# Patient Record
Sex: FEMALE | URBAN - METROPOLITAN AREA
[De-identification: names, ages, dates, MRNs, and addresses within clinical notes are randomized per-mention and may not be internally consistent; named-entity substitution may affect disease eponyms.]

---

## 2018-01-18 ENCOUNTER — OFFICE VISIT (OUTPATIENT)
Dept: INTERNAL MEDICINE CLINIC | Age: 38
End: 2018-01-18

## 2018-01-18 VITALS
SYSTOLIC BLOOD PRESSURE: 141 MMHG | WEIGHT: 189.2 LBS | DIASTOLIC BLOOD PRESSURE: 67 MMHG | HEART RATE: 98 BPM | OXYGEN SATURATION: 95 % | BODY MASS INDEX: 29.7 KG/M2 | HEIGHT: 67 IN | RESPIRATION RATE: 18 BRPM | TEMPERATURE: 97.9 F

## 2018-01-18 DIAGNOSIS — S02.5XXA CLOSED FRACTURE OF TOOTH, INITIAL ENCOUNTER: ICD-10-CM

## 2018-01-18 DIAGNOSIS — R63.1 EXCESSIVE THIRST: ICD-10-CM

## 2018-01-18 DIAGNOSIS — R73.09 ELEVATED GLUCOSE: ICD-10-CM

## 2018-01-18 DIAGNOSIS — R80.9 PROTEINURIA, UNSPECIFIED TYPE: ICD-10-CM

## 2018-01-18 DIAGNOSIS — R03.0 ELEVATED BLOOD PRESSURE READING: ICD-10-CM

## 2018-01-18 DIAGNOSIS — E66.3 OVERWEIGHT (BMI 25.0-29.9): ICD-10-CM

## 2018-01-18 DIAGNOSIS — Z13.220 SCREENING FOR LIPID DISORDERS: ICD-10-CM

## 2018-01-18 DIAGNOSIS — G44.209 TENSION-TYPE HEADACHE, NOT INTRACTABLE, UNSPECIFIED CHRONICITY PATTERN: ICD-10-CM

## 2018-01-18 DIAGNOSIS — R35.0 URINARY FREQUENCY: Primary | ICD-10-CM

## 2018-01-18 DIAGNOSIS — R31.0 GROSS HEMATURIA: ICD-10-CM

## 2018-01-18 LAB
BILIRUB UR QL STRIP: NORMAL
GLUCOSE UR-MCNC: NEGATIVE MG/DL
KETONES P FAST UR STRIP-MCNC: NEGATIVE MG/DL
PH UR STRIP: 6 [PH] (ref 4.6–8)
PROT UR QL STRIP: NORMAL
SP GR UR STRIP: 1.03 (ref 1–1.03)
UA UROBILINOGEN AMB POC: NORMAL (ref 0.2–1)
URINALYSIS CLARITY POC: NORMAL
URINALYSIS COLOR POC: NORMAL
URINE BLOOD POC: NORMAL
URINE LEUKOCYTES POC: NORMAL
URINE NITRITES POC: NEGATIVE

## 2018-01-18 NOTE — MR AVS SNAPSHOT
[address] [address] [place] [address] 
[phone] Patient: Viral Denis MRN: [id number] LEQ:7/19/[birth year]14 Visit Information Date & Time Provider Department Dept. Phone Encounter #  
 1/18/2018  9:00 AM Cam[facility] Cord[facility] [phone] [id number] Follow-up Instructions Return in about 1 month (around 2/18/2018) for follow-up blood pressure and labs. Upcoming Health Maintenance Date Due DTaP/Tdap/Td series (1 - Tdap) 9/15/2003 PAP AKA CERVICAL CYTOLOGY 9/15/2003 Influenza Age 5 to Adult 8/1/2017 Allergies as of 1/18/2018  Review Complete On: 1/18/2018 By: Jimi Allen LPN No Known Allergies Current Immunizations  Reviewed on 1/18/2018 Name Date Influenza Vaccine 11/5/2017 Reviewed by Emelia Paez MD on 1/18/2018 at  9:48 AM  
You Were Diagnosed With   
  
 Codes Comments Urinary frequency    -  Primary ICD-10-CM: R35.0 ICD-9-CM: 788.41 Tension-type headache, not intractable, unspecified chronicity pattern     ICD-10-CM: G44.209 ICD-9-CM: 339.10 Excessive thirst     ICD-10-CM: R63.1 ICD-9-CM: 783.5 Elevated glucose     ICD-10-CM: R73.09 
ICD-9-CM: 790.29 Closed fracture of tooth, initial encounter     ICD-10-CM: S02. Jose Maria Wilson ICD-9-CM: 873.63 Screening for lipid disorders     ICD-10-CM: Z13.220 ICD-9-CM: V77.91 Overweight (BMI 25.0-29. 9)     ICD-10-CM: E07.4 ICD-9-CM: 278.02 Elevated blood pressure reading     ICD-10-CM: R03.0 ICD-9-CM: 796.2 Vitals BP Pulse Temp Resp Height(growth percentile) Weight(growth percentile) 141/67 (BP 1 Location: Left arm, BP Patient Position: Sitting) 98 97.9 °F (36.6 °C) (Oral) 18 5' 7\" (1.702 m) 189 lb 3.2 oz (85.8 kg) LMP SpO2 BMI OB Status Smoking Status 12/23/2017 (Exact Date) 95% 29.63 kg/m2 Having regular periods Never Smoker BMI and BSA Data Body Mass Index Body Surface Area  
 29.63 kg/m 2 2.01 m 2 Preferred Pharmacy Pharmacy Name Phone [id number], [address] [phone] Your Updated Medication List  
  
   
This list is accurate as of: 1/18/18 10:02 AM.  Always use your most recent med list.  
  
  
  
  
 B COMPLEX PO Take  by mouth. WOMEN'S DAILY MULTIVITAMIN PO Take  by mouth. We Performed the Following AMB POC URINALYSIS DIP STICK AUTO W/O MICRO [17235 CPT(R)] CBC W/O DIFF [96469 CPT(R)] HEMOGLOBIN A1C WITH EAG [95381 CPT(R)] LIPID PANEL [80465 CPT(R)] METABOLIC PANEL, COMPREHENSIVE [48858 CPT(R)] REFERRAL TO DENTISTRY [REF18 Custom] Comments:  
 Please consider getting tooth evaluated soon to avoid future infection. [facility] dentistry is one good source for quality dental care. [address] 
 
 
 
[address] [facility]: [phone] Follow-up Instructions Return in about 1 month (around 2/18/2018) for follow-up blood pressure and labs. Referral Information Referral ID Referred By Referred To  
  
 [id number] Anirudh Crowder Not Available Visits Status Start Date End Date 1 New Request 1/18/18 1/18/19 If your referral has a status of pending review or denied, additional information will be sent to support the outcome of this decision. Patient Instructions Learning About Low-Carbohydrate Diets for Weight Loss What is a low-carbohydrate diet? Low-carb diets avoid foods that are high in carbohydrate. These high-carb foods include pasta, bread, rice, cereal, fruits, and starchy vegetables. Instead, these diets usually have you eat foods that are high in fat and protein. Many people lose weight quickly on a low-carb diet.  But the early weight loss is water. People on this diet often gain the weight back after they start eating carbs again. Not all diet plans are safe or work well. A lot of the evidence shows that low-carb diets aren't healthy. That's because these diets often don't include healthy foods like fruits and vegetables. Losing weight safely means balancing protein, fat, and carbs with every meal and snack. And low-carb diets don't always provide the vitamins, minerals, and fiber you need. If you have a serious medical condition, talk to your doctor before you try any diet. These conditions include kidney disease, heart disease, type 2 diabetes, high cholesterol, and high blood pressure. If you are pregnant, it may not be safe for your baby if you are on a low-carb diet. How can you lose weight safely? You might have heard that a diet plan helped another person lose weight. But that doesn't mean that it will work for you. It is very hard to stay on a diet that includes lots of big changes in your eating habits. If you want to get to a healthy weight and stay there, making healthy lifestyle changes will often work better than dieting. These steps can help. · Make a plan for change. Work with your doctor to create a plan that is right for you. · See a dietitian. He or she can show you how to make healthy changes in your eating habits. · Manage stress. If you have a lot of stress in your life, it can be hard to focus on making healthy changes to your daily habits. · Track your food and activity. You are likely to do better at losing weight if you keep track of what you eat and what you do. Follow-up care is a key part of your treatment and safety. Be sure to make and go to all appointments, and call your doctor if you are having problems. It's also a good idea to know your test results and keep a list of the medicines you take. Where can you learn more? Go to http://serene-ran.info/. Enter A121 in the search box to learn more about \"Learning About Low-Carbohydrate Diets for Weight Loss. \" Current as of: May 12, 2017 Content Version: 11.4 © 5392-7127 Healthwise, Incorporated. Care instructions adapted under license by [employer] (which disclaims liability or warranty for this information). If you have questions about a medical condition or this instruction, always ask your healthcare professional. [address] any warranty or liability for your use of this information. Introducing [facility] & HEALTH SERVICES! Najma Isidro introduces Uniquedu patient portal. Now you can access parts of your medical record, email your doctor's office, and request medication refills online. 1. In your internet browser, go to [email or web address]/121 Rentals 2. Click on the First Time User? Click Here link in the Sign In box. You will see the New Member Sign Up page. 3. Enter your Uniquedu Access Code exactly as it appears below. You will not need to use this code after youve completed the sign-up process. If you do not sign up before the expiration date, you must request a new code. · Uniquedu Access Code: [email or web address] Expires: 4/18/2018 10:02 AM 
 
4. Enter the last four digits of your Social Security Number (xxxx) and Date of Birth (mm/dd/yyyy) as indicated and click Submit. You will be taken to the next sign-up page. 5. Create a Uniquedu ID. This will be your Uniquedu login ID and cannot be changed, so think of one that is secure and easy to remember. 6. Create a Uniquedu password. You can change your password at any time. 7. Enter your Password Reset Question and Answer. This can be used at a later time if you forget your password. 8. Enter your e-mail address. You will receive e-mail notification when new information is available in [address] 9. Click Sign Up. You can now view and download portions of your medical record. 10. Click the Download Summary menu link to download a portable copy of your medical information. If you have questions, please visit the Frequently Asked Questions section of the geolad website. Remember, geolad is NOT to be used for urgent needs. For medical emergencies, dial 911. Now available from your iPhone and Android! Please provide this summary of care documentation to your next provider. Your primary care clinician is listed as Tonia Ba. If you have any questions after today's visit, please call [phone].

## 2018-01-18 NOTE — PROGRESS NOTES
LEENA Vuong is a 30 y.o. female, she presents today for:     Has 3 children, 13 6and 3years old. Notes she has been having a lot of headaches, stomach pain. \"At first I thought I was pregnant, but I am not pregnant\". This has been true for around a week. Extreme thirst and urinating frequently. Normal BM, no constipation. No feeling hot cold nor having chills. No pain with urination no burning. Had pap smear done in December in last year. Moved from [place] around a year ago. Has regular periods, had an IUD removed. Would like to consider more children in 1-2 years.  currently. Took like 5 home pregnancy test. Due for next pregnancy test. No breast soreness. Period due in next week. Father with history of diabetes. PMH/PSH: reviewed and updated  Sochx:  reports that she has never smoked. She has never used smokeless tobacco. She reports that she does not drink alcohol or use illicit drugs. Famhx: reviewed and updated     All: No Known Allergies  Med:   Current Outpatient Prescriptions   Medication Sig    MULTIVIT WITH CALCIUM,IRON,MIN (WOMEN'S DAILY MULTIVITAMIN PO) Take  by mouth.  VITAMIN B COMPLEX (B COMPLEX PO) Take  by mouth. No current facility-administered medications for this visit. Review of Systems   Constitutional: Positive for malaise/fatigue. Negative for chills and fever. HENT: Negative for ear pain and sore throat. Eyes: Negative for blurred vision. Respiratory: Negative for cough and shortness of breath. Cardiovascular: Negative for chest pain and leg swelling. Gastrointestinal: Negative for blood in stool, constipation and heartburn. Genitourinary: Negative for dysuria. PE:  Blood pressure 141/67, pulse 98, temperature 97.9 °F (36.6 °C), temperature source Oral, resp. rate 18, height 5' 7\" (1.702 m), weight 189 lb 3.2 oz (85.8 kg), last menstrual period 12/23/2017, SpO2 95 %. Body mass index is 29.63 kg/(m^2).   Physical Exam Constitutional: She is oriented to person, place, and time. She appears well-developed and well-nourished. No distress. HENT:   Head: Normocephalic. Mouth/Throat: Oropharynx is clear and moist.   Eyes: Conjunctivae and EOM are normal.   Neck: Neck supple. Cardiovascular: Normal rate, regular rhythm and normal heart sounds. Pulmonary/Chest: Effort normal and breath sounds normal.   Abdominal: Soft. She exhibits no distension and no mass. There is no tenderness. Neurological: She is alert and oriented to person, place, and time. Skin: Skin is warm and dry. Psychiatric: She has a normal mood and affect. Judgment and thought content normal.   At one point slightly tearful. Nursing note and vitals reviewed. Labs:   See addendum    A/P:  30 y.o. female    ICD-10-CM ICD-9-CM    1. Urinary frequency R35.0 788.41 AMB POC URINALYSIS DIP STICK AUTO W/O MICRO      CBC W/O DIFF   2. Tension-type headache, not intractable, unspecified chronicity pattern G44.209 339.10    3. Excessive thirst R63.1 783.5 CBC W/O DIFF   4. Elevated glucose R73.09 790.29 CBC W/O DIFF      HEMOGLOBIN A1C WITH EAG   5. Closed fracture of tooth, initial encounter S02. 5XXA 873.63 REFERRAL TO DENTISTRY   6. Screening for lipid disorders Z13.220 V77.91 LIPID PANEL   7. Overweight (BMI 25.0-29. 9) E66.3 278.02 CBC W/O DIFF      METABOLIC PANEL, COMPREHENSIVE   8. Elevated blood pressure reading R03.0 796.2 CBC W/O DIFF      METABOLIC PANEL, COMPREHENSIVE     Tea colored urine with 4+ blood and 1+ protein. Patient without strong symptoms for UTI. Send urine culture to rule out further. Possible acute nephritic or nephrotic syndrome? ? BP only mildly elevated. Did have a diarrhea episode in past 5 days. Then color change occured 2 days ago. Exam otherwise not consistent with nephrotic syndrome. - renal function, urinalysis with sediment and spot urine protein requested.      Prediabetes vs new onset diabetes: discussed working on lower carbs. And labs requested. Long term will plan for metformin if kidneys are healthy. Elevated BP: repeat in 1 week. If remaining elevated with start meds. - She was given AVS and expressed understanding with the diagnosis and plan as discussed. Follow-up Disposition:  Return in about 1 week (around 1/25/2018) for follow-up blood pressure and labs.   Future Appointments  Date Time Provider Zara Shonda   1/25/2018 3:30 PM Viky Salazar MD [facility]

## 2018-01-18 NOTE — PATIENT INSTRUCTIONS
Learning About Low-Carbohydrate Diets for Weight Loss  What is a low-carbohydrate diet? Low-carb diets avoid foods that are high in carbohydrate. These high-carb foods include pasta, bread, rice, cereal, fruits, and starchy vegetables. Instead, these diets usually have you eat foods that are high in fat and protein. Many people lose weight quickly on a low-carb diet. But the early weight loss is water. People on this diet often gain the weight back after they start eating carbs again. Not all diet plans are safe or work well. A lot of the evidence shows that low-carb diets aren't healthy. That's because these diets often don't include healthy foods like fruits and vegetables. Losing weight safely means balancing protein, fat, and carbs with every meal and snack. And low-carb diets don't always provide the vitamins, minerals, and fiber you need. If you have a serious medical condition, talk to your doctor before you try any diet. These conditions include kidney disease, heart disease, type 2 diabetes, high cholesterol, and high blood pressure. If you are pregnant, it may not be safe for your baby if you are on a low-carb diet. How can you lose weight safely? You might have heard that a diet plan helped another person lose weight. But that doesn't mean that it will work for you. It is very hard to stay on a diet that includes lots of big changes in your eating habits. If you want to get to a healthy weight and stay there, making healthy lifestyle changes will often work better than dieting. These steps can help. · Make a plan for change. Work with your doctor to create a plan that is right for you. · See a dietitian. He or she can show you how to make healthy changes in your eating habits. · Manage stress. If you have a lot of stress in your life, it can be hard to focus on making healthy changes to your daily habits. · Track your food and activity.  You are likely to do better at losing weight if you keep track of what you eat and what you do. Follow-up care is a key part of your treatment and safety. Be sure to make and go to all appointments, and call your doctor if you are having problems. It's also a good idea to know your test results and keep a list of the medicines you take. Where can you learn more? Go to http://serene-ran.info/. Enter A121 in the search box to learn more about \"Learning About Low-Carbohydrate Diets for Weight Loss. \"  Current as of: May 12, 2017  Content Version: 11.4  © 6289-5483 Healthwise, [employer]. Care instructions adapted under license by [employer] (which disclaims liability or warranty for this information). If you have questions about a medical condition or this instruction, always ask your healthcare professional. [address] any warranty or liability for your use of this information.

## 2018-01-18 NOTE — PROGRESS NOTES
Chief Complaint   Patient presents with   [facility] Patient     patient states that she would like a physical and some blood work done because she hasnt been felling well. Exam Room #3  1. Have you been to the ER, urgent care clinic since your last visit? Hospitalized since your last visit? No    2. Have you seen or consulted any other health care providers outside of the [address] [place] [address] since your last visit? Include any pap smears or colon screening. No    Fasting    Visit Vitals    /67 (BP 1 Location: Left arm, BP Patient Position: Sitting)    Pulse 98    Temp 97.9 °F (36.6 °C) (Oral)    Resp 18    Ht 5' 7\" (1.702 m)    Wt 189 lb 3.2 oz (85.8 kg)    SpO2 95%    BMI 29.63 kg/m2       .

## 2018-01-19 LAB
ALBUMIN SERPL-MCNC: 4.7 G/DL (ref 3.5–5.5)
ALBUMIN/GLOB SERPL: 1.4 {RATIO} (ref 1.2–2.2)
ALP SERPL-CCNC: 100 IU/L (ref 39–117)
ALT SERPL-CCNC: 9 IU/L (ref 0–32)
APPEARANCE UR: ABNORMAL
AST SERPL-CCNC: 13 IU/L (ref 0–40)
BACTERIA #/AREA URNS HPF: ABNORMAL /[HPF]
BACTERIA UR CULT: NO GROWTH
BASOPHILS # BLD AUTO: 0.1 X10E3/UL (ref 0–0.2)
BASOPHILS NFR BLD AUTO: 1 %
BILIRUB SERPL-MCNC: 0.4 MG/DL (ref 0–1.2)
BILIRUB UR QL STRIP: NEGATIVE
BUN SERPL-MCNC: 16 MG/DL (ref 6–20)
BUN/CREAT SERPL: 24 (ref 9–23)
CALCIUM SERPL-MCNC: 9.5 MG/DL (ref 8.7–10.2)
CASTS URNS QL MICRO: ABNORMAL /LPF
CHLORIDE SERPL-SCNC: 98 MMOL/L (ref 96–106)
CHOLEST SERPL-MCNC: 267 MG/DL (ref 100–199)
CO2 SERPL-SCNC: 24 MMOL/L (ref 18–29)
COLOR UR: ABNORMAL
CREAT SERPL-MCNC: 0.68 MG/DL (ref 0.57–1)
CREAT UR-MCNC: 309.2 MG/DL
EOSINOPHIL # BLD AUTO: 0.3 X10E3/UL (ref 0–0.4)
EOSINOPHIL NFR BLD AUTO: 3 %
EPI CELLS #/AREA URNS HPF: >10 /HPF
ERYTHROCYTE [DISTWIDTH] IN BLOOD BY AUTOMATED COUNT: 14.1 % (ref 12.3–15.4)
EST. AVERAGE GLUCOSE BLD GHB EST-MCNC: 117 MG/DL
GLOBULIN SER CALC-MCNC: 3.3 G/DL (ref 1.5–4.5)
GLUCOSE SERPL-MCNC: 93 MG/DL (ref 65–99)
GLUCOSE UR QL: NEGATIVE
HBA1C MFR BLD: 5.7 % (ref 4.8–5.6)
HCT VFR BLD AUTO: 45.1 % (ref 34–46.6)
HDLC SERPL-MCNC: 59 MG/DL
HGB BLD-MCNC: 14.8 G/DL (ref 11.1–15.9)
HGB UR QL STRIP: ABNORMAL
IMM GRANULOCYTES # BLD: 0 X10E3/UL (ref 0–0.1)
IMM GRANULOCYTES NFR BLD: 0 %
KETONES UR QL STRIP: NEGATIVE
LDLC SERPL CALC-MCNC: 174 MG/DL (ref 0–99)
LEUKOCYTE ESTERASE UR QL STRIP: ABNORMAL
LYMPHOCYTES # BLD AUTO: 3.1 X10E3/UL (ref 0.7–3.1)
LYMPHOCYTES NFR BLD AUTO: 29 %
MCH RBC QN AUTO: 28.1 PG (ref 26.6–33)
MCHC RBC AUTO-ENTMCNC: 32.8 G/DL (ref 31.5–35.7)
MCV RBC AUTO: 86 FL (ref 79–97)
MICRO URNS: ABNORMAL
MONOCYTES # BLD AUTO: 0.7 X10E3/UL (ref 0.1–0.9)
MONOCYTES NFR BLD AUTO: 6 %
MUCOUS THREADS URNS QL MICRO: PRESENT
NEUTROPHILS # BLD AUTO: 6.7 X10E3/UL (ref 1.4–7)
NEUTROPHILS NFR BLD AUTO: 61 %
NITRITE UR QL STRIP: NEGATIVE
PH UR STRIP: 5 [PH] (ref 5–7.5)
PLATELET # BLD AUTO: 323 X10E3/UL (ref 150–379)
POTASSIUM SERPL-SCNC: 4.4 MMOL/L (ref 3.5–5.2)
PROT SERPL-MCNC: 8 G/DL (ref 6–8.5)
PROT UR QL STRIP: ABNORMAL
PROT UR-MCNC: 40.5 MG/DL
RBC # BLD AUTO: 5.27 X10E6/UL (ref 3.77–5.28)
RBC #/AREA URNS HPF: ABNORMAL /HPF
SODIUM SERPL-SCNC: 138 MMOL/L (ref 134–144)
SP GR UR: 1.03 (ref 1–1.03)
TRIGL SERPL-MCNC: 169 MG/DL (ref 0–149)
UROBILINOGEN UR STRIP-MCNC: 0.2 MG/DL (ref 0.2–1)
VLDLC SERPL CALC-MCNC: 34 MG/DL (ref 5–40)
WBC # BLD AUTO: 10.9 X10E3/UL (ref 3.4–10.8)
WBC #/AREA URNS HPF: ABNORMAL /HPF

## 2018-01-19 NOTE — PROGRESS NOTES
Called and spoke with patient. She notes that after pushing more fluids her urine no longer looks dark and is back to yellow. With normal creatinine and albutmin as well as no anemia and normal platelets, I am reassured that she is unlikely to have a new kidney disease or hemolysis. However, will continue with plan to follow-up next week to see if any residual signs of blood in urine and to further discuss elevated A1C and cholesterol, plan for treating overweight, and recheck of blood pressure. Patient expressed understanding and relief that her kidney health on the labs looks good and no diabetes.    Omi Gilman MD

## 2018-01-25 ENCOUNTER — OFFICE VISIT (OUTPATIENT)
Dept: INTERNAL MEDICINE CLINIC | Age: 38
End: 2018-01-25

## 2018-01-25 VITALS
OXYGEN SATURATION: 98 % | HEIGHT: 67 IN | HEART RATE: 82 BPM | DIASTOLIC BLOOD PRESSURE: 91 MMHG | SYSTOLIC BLOOD PRESSURE: 128 MMHG | TEMPERATURE: 98.3 F | BODY MASS INDEX: 29.9 KG/M2 | WEIGHT: 190.5 LBS | RESPIRATION RATE: 16 BRPM

## 2018-01-25 DIAGNOSIS — R73.09 ELEVATED HEMOGLOBIN A1C: ICD-10-CM

## 2018-01-25 DIAGNOSIS — R31.0 GROSS HEMATURIA: Primary | ICD-10-CM

## 2018-01-25 LAB
BILIRUB UR QL STRIP: NEGATIVE
GLUCOSE UR-MCNC: NEGATIVE MG/DL
HCG URINE, QL. (POC): NEGATIVE
KETONES P FAST UR STRIP-MCNC: NEGATIVE MG/DL
PH UR STRIP: 6.5 [PH] (ref 4.6–8)
PROT UR QL STRIP: NEGATIVE
SP GR UR STRIP: 1.01 (ref 1–1.03)
UA UROBILINOGEN AMB POC: NORMAL (ref 0.2–1)
URINALYSIS CLARITY POC: NORMAL
URINALYSIS COLOR POC: YELLOW
URINE BLOOD POC: NORMAL
URINE LEUKOCYTES POC: NORMAL
URINE NITRITES POC: NEGATIVE
VALID INTERNAL CONTROL?: YES

## 2018-01-25 RX ORDER — METFORMIN HYDROCHLORIDE 500 MG/1
500 TABLET ORAL 2 TIMES DAILY WITH MEALS
Qty: 60 TAB | Refills: 5 | Status: SHIPPED | OUTPATIENT
Start: 2018-01-25

## 2018-01-25 NOTE — PROGRESS NOTES
Marcy Fernández is a 30 y.o. female  Chief Complaint   Patient presents with    Blood in Urine     1 week follow up    Blood Pressure Check     1. Have you been to an emergency room, urgent clinic, or hospitalized since your last visit? NO  If yes, where when, and reason for visit? 2. Have seen or consulted any other health care provider since your last visit? NO  Please include any pap smears or colon screening in this section  If yes, where when, and reason for visit?

## 2018-01-25 NOTE — MR AVS SNAPSHOT
[address] [facility] [address] [place] [address] 
[phone] Patient: Clari Mejia MRN: [id number] XUF:[birth year] Visit Information Date & Time Provider Department Dept. Phone Encounter #  
 1/25/2018  3:30 PM Amador Turner MD [address] [facility] [phone] [id number] Follow-up Instructions Return in about 6 months (around 7/25/2018) for follow-up weight, blood pressure (or earlier if any new concern) be sure to complete urology appt. Upcoming Health Maintenance Date Due DTaP/Tdap/Td series (1 - Tdap) 9/15/2003 PAP AKA CERVICAL CYTOLOGY 9/15/2003 Allergies as of 1/25/2018  Review Complete On: 1/25/2018 By: Sandra George A Rash, LPN No Known Allergies Current Immunizations  Reviewed on 1/18/2018 Name Date Influenza Vaccine 11/5/2017 Not reviewed this visit You Were Diagnosed With   
  
 Codes Comments Gross hematuria    -  Primary ICD-10-CM: R31.0 ICD-9-CM: 599.71 Elevated hemoglobin A1c     ICD-10-CM: R73.09 
ICD-9-CM: 790.29 Vitals BP Pulse Temp Resp Height(growth percentile) Weight(growth percentile) (!) 128/91 (BP 1 Location: Left arm, BP Patient Position: Sitting) 82 98.3 °F (36.8 °C) (Oral) 16 5' 7\" (1.702 m) 190 lb 8 oz (86.4 kg) LMP SpO2 BMI OB Status Smoking Status 01/20/2018 98% 29.84 kg/m2 Having regular periods Never Smoker Vitals History BMI and BSA Data Body Mass Index Body Surface Area  
 29.84 kg/m 2 2.02 m 2 Preferred Pharmacy Pharmacy Name Phone [id number], [address] [place] [address] [phone] Your Updated Medication List  
  
   
This list is accurate as of: 1/25/18  4:11 PM.  Always use your most recent med list.  
  
  
  
  
 B COMPLEX PO Take  by mouth.  
  
 metFORMIN 500 mg tablet Commonly known as:  GLUCOPHAGE  
 Take 1 Tab by mouth two (2) times daily (with meals). WOMEN'S DAILY MULTIVITAMIN PO Take  by mouth. Prescriptions Sent to Pharmacy Refills  
 metFORMIN (GLUCOPHAGE) 500 mg tablet 5 Sig: Take 1 Tab by mouth two (2) times daily (with meals). Class: Normal  
 Pharmacy: [facility]/pharmacy #[id number] #: [phone] Route: Oral  
  
We Performed the Following AMB POC URINALYSIS DIP STICK AUTO W/O MICRO [55072 CPT(R)] AMB POC URINE PREGNANCY TEST, VISUAL COLOR COMPARISON [23819 CPT(R)] REFERRAL TO UROLOGY [SHY616 Custom] Follow-up Instructions Return in about 6 months (around 7/25/2018) for follow-up weight, blood pressure (or earlier if any new concern) be sure to complete urology appt. Referral Information Referral ID Referred By Referred To  
  
 [id number] Sade Leal MD   
   [address] Phone: [phone] Fax: [phone] Visits Status Start Date End Date 1 New Request 1/25/18 1/25/19 If your referral has a status of pending review or denied, additional information will be sent to support the outcome of this decision. Patient Instructions Results for orders placed or performed in visit on 01/25/18 AMB POC URINALYSIS DIP STICK AUTO W/O MICRO Result Value Ref Range Color (UA POC) Yellow Clarity (UA POC) Cloudy Glucose (UA POC) Negative Negative Bilirubin (UA POC) Negative Negative Ketones (UA POC) Negative Negative Specific gravity (UA POC) 1.015 1.001 - 1.035 Blood (UA POC) 4+ Negative pH (UA POC) 6.5 4.6 - 8.0 Protein (UA POC) Negative Negative Urobilinogen (UA POC) 0.2 mg/dL 0.2 - 1 Nitrites (UA POC) Negative Negative Leukocyte esterase (UA POC) 1+ Negative AMB POC URINE PREGNANCY TEST, VISUAL COLOR COMPARISON Result Value Ref Range VALID INTERNAL CONTROL POC Yes HCG urine, Ql. (POC) Negative Negative Lab Results Component Value Date/Time Sodium 138 01/18/2018 10:09 AM  
 Potassium 4.4 01/18/2018 10:09 AM  
 Chloride 98 01/18/2018 10:09 AM  
 CO2 24 01/18/2018 10:09 AM  
 Glucose 93 01/18/2018 10:09 AM  
 BUN 16 01/18/2018 10:09 AM  
 Creatinine 0.68 01/18/2018 10:09 AM  
 BUN/Creatinine ratio 24 01/18/2018 10:09 AM  
 Calcium 9.5 01/18/2018 10:09 AM  
 Bilirubin, total 0.4 01/18/2018 10:09 AM  
 AST (SGOT) 13 01/18/2018 10:09 AM  
 Alk. phosphatase 100 01/18/2018 10:09 AM  
 Protein, total 8.0 01/18/2018 10:09 AM  
 Albumin 4.7 01/18/2018 10:09 AM  
 A-G Ratio 1.4 01/18/2018 10:09 AM  
 ALT (SGPT) 9 01/18/2018 10:09 AM  
 
Lab Results Component Value Date/Time WBC 10.9 01/18/2018 10:09 AM  
 HGB 14.8 01/18/2018 10:09 AM  
 HCT 45.1 01/18/2018 10:09 AM  
 PLATELET 096 18/31/2564 10:09 AM  
 MCV 86 01/18/2018 10:09 AM  
 
Urine culture negative for growth. Introducing \[facility]\"" & HEALTH SERVICES! Kamille Carmen introduces [employer] patient portal. Now you can access parts of your medical record, email your doctor's office, and request medication refills online. 1. In your internet browser, go to [email or web address] [email or web address] It [email or web address] [email or web address]/Get 2 It Sales 2. Click on the First Time User? Click Here link in the Sign In box. You will see the New Member Sign Up page. 3. Enter your [employer] Access Code exactly as it appears below. You will not need to use this code after youve completed the sign-up process. If you do not sign up before the expiration date, you must request a new code. · [employer] Access Code: [email or web address] Expires: 4/18/2018 10:02 AM 
 
4. Enter the last four digits of your Social Security Number (xxxx) and Date of Birth (mm/dd/yyyy) as indicated and click Submit. You will be taken to the next sign-up page. 5. Create a MyChart ID.  This will be your Intellijoulehart login ID and cannot be changed, so think of one that is secure and easy to remember. 6. Create a Trending Taste password. You can change your password at any time. 7. Enter your Password Reset Question and Answer. This can be used at a later time if you forget your password. 8. Enter your e-mail address. You will receive e-mail notification when new information is available in [address] 9. Click Sign Up. You can now view and download portions of your medical record. 10. Click the Download Summary menu link to download a portable copy of your medical information. If you have questions, please visit the Frequently Asked Questions section of the Trending Taste website. Remember, Trending Taste is NOT to be used for urgent needs. For medical emergencies, dial 911. Now available from your iPhone and Android! Please provide this summary of care documentation to your next provider. Your primary care clinician is listed as Trae Snyder. If you have any questions after today's visit, please call [phone].

## 2018-01-25 NOTE — PATIENT INSTRUCTIONS
Results for orders placed or performed in visit on 01/25/18   AMB POC URINALYSIS DIP STICK AUTO W/O MICRO   Result Value Ref Range    Color (UA POC) Yellow     Clarity (UA POC) Cloudy     Glucose (UA POC) Negative Negative    Bilirubin (UA POC) Negative Negative    Ketones (UA POC) Negative Negative    Specific gravity (UA POC) 1.015 1.001 - 1.035    Blood (UA POC) 4+ Negative    pH (UA POC) 6.5 4.6 - 8.0    Protein (UA POC) Negative Negative    Urobilinogen (UA POC) 0.2 mg/dL 0.2 - 1    Nitrites (UA POC) Negative Negative    Leukocyte esterase (UA POC) 1+ Negative   AMB POC URINE PREGNANCY TEST, VISUAL COLOR COMPARISON   Result Value Ref Range    VALID INTERNAL CONTROL POC Yes     HCG urine, Ql. (POC) Negative Negative     Lab Results   Component Value Date/Time    Sodium 138 01/18/2018 10:09 AM    Potassium 4.4 01/18/2018 10:09 AM    Chloride 98 01/18/2018 10:09 AM    CO2 24 01/18/2018 10:09 AM    Glucose 93 01/18/2018 10:09 AM    BUN 16 01/18/2018 10:09 AM    Creatinine 0.68 01/18/2018 10:09 AM    BUN/Creatinine ratio 24 01/18/2018 10:09 AM    Calcium 9.5 01/18/2018 10:09 AM    Bilirubin, total 0.4 01/18/2018 10:09 AM    AST (SGOT) 13 01/18/2018 10:09 AM    Alk. phosphatase 100 01/18/2018 10:09 AM    Protein, total 8.0 01/18/2018 10:09 AM    Albumin 4.7 01/18/2018 10:09 AM    A-G Ratio 1.4 01/18/2018 10:09 AM    ALT (SGPT) 9 01/18/2018 10:09 AM     Lab Results   Component Value Date/Time    WBC 10.9 01/18/2018 10:09 AM    HGB 14.8 01/18/2018 10:09 AM    HCT 45.1 01/18/2018 10:09 AM    PLATELET 268 90/82/3256 10:09 AM    MCV 86 01/18/2018 10:09 AM     Urine culture negative for growth.

## 2018-01-25 NOTE — PROGRESS NOTES
Patients name is Rubi Latham (michelle is husbands first name). LEENA Webber is a 30 y.o. female, she presents today for:    Notes period started on day after last visit. Resolved 2-3 days ago. Has had some moderate pains in lower abdomen, perhaps wrapping in flank like fashion. But not extreme. No known family history of renal disease. No known family history of blood in the urine. PMH/PSH: reviewed and updated  Sochx:  reports that she has never smoked. She has never used smokeless tobacco. She reports that she does not drink alcohol or use illicit drugs. Famhx: reviewed and updated     All: No Known Allergies  Med:   Current Outpatient Prescriptions   Medication Sig    MULTIVIT WITH CALCIUM,IRON,MIN (WOMEN'S DAILY MULTIVITAMIN PO) Take  by mouth.  VITAMIN B COMPLEX (B COMPLEX PO) Take  by mouth. No current facility-administered medications for this visit. Review of Systems   Constitutional: Negative for chills, fever and malaise/fatigue. Respiratory: Negative for shortness of breath. Cardiovascular: Negative for chest pain. PE:  Blood pressure (!) 128/91, pulse 82, temperature 98.3 °F (36.8 °C), temperature source Oral, resp. rate 16, height 5' 7\" (1.702 m), weight 190 lb 8 oz (86.4 kg), last menstrual period 01/20/2018, SpO2 98 %. Body mass index is 29.84 kg/(m^2). Physical Exam   Constitutional: She is oriented to person, place, and time. No distress. HENT:   Head: Normocephalic. Mouth/Throat: Oropharynx is clear and moist.   Eyes: Conjunctivae and EOM are normal.   Neck: Neck supple. Cardiovascular: Normal rate, regular rhythm and normal heart sounds. Pulmonary/Chest: Effort normal and breath sounds normal.   Neurological: She is alert and oriented to person, place, and time. Skin: Skin is warm and dry. Nursing note and vitals reviewed.     Labs:   Results for orders placed or performed in visit on 01/25/18   AMB POC URINALYSIS DIP STICK AUTO W/O MICRO Result Value Ref Range    Color (UA POC) Yellow     Clarity (UA POC) Cloudy     Glucose (UA POC) Negative Negative    Bilirubin (UA POC) Negative Negative    Ketones (UA POC) Negative Negative    Specific gravity (UA POC) 1.015 1.001 - 1.035    Blood (UA POC) 4+ Negative    pH (UA POC) 6.5 4.6 - 8.0    Protein (UA POC) Negative Negative    Urobilinogen (UA POC) 0.2 mg/dL 0.2 - 1    Nitrites (UA POC) Negative Negative    Leukocyte esterase (UA POC) 1+ Negative   AMB POC URINE PREGNANCY TEST, VISUAL COLOR COMPARISON   Result Value Ref Range    VALID INTERNAL CONTROL POC Yes     HCG urine, Ql. (POC) Negative Negative     A/P:  30 y.o. female    ICD-10-CM ICD-9-CM    1. Gross hematuria R31.0 599.71 AMB POC URINALYSIS DIP STICK AUTO W/O MICRO      AMB POC URINE PREGNANCY TEST, VISUAL COLOR COMPARISON      REFERRAL TO UROLOGY   2. Elevated hemoglobin A1c R73.09 790.29 metFORMIN (GLUCOPHAGE) 500 mg tablet     Gross hematuria:    - with normal kidney function. Normal albumin level. Normal urine protein to creatinine ratio (129 mg/g)   - bp borderline    - proteinuria within expected levels for amount of hematuria. - suspect hematuria is physical and not medical renal disease   - referred to urology for appropriate imaging and further work-up   - patient did have period between takes of hematuria but denied active vaginal bleeding     Elevated A1C: new diagnosis patient  To trial start of metformin. Discussed tolerance. Okay to start as no sign of medical renal disease. To continue good hydration.     - She was given AVS and expressed understanding with the diagnosis and plan as discussed. Follow-up Disposition:  Return in about 6 months (around 7/25/2018) for follow-up weight, blood pressure (or earlier if any new concern) be sure to complete urology appt.

## 2018-01-26 ENCOUNTER — TELEPHONE (OUTPATIENT)
Dept: INTERNAL MEDICINE CLINIC | Age: 38
End: 2018-01-26

## 2018-01-26 PROBLEM — R73.09 ELEVATED HEMOGLOBIN A1C: Status: ACTIVE | Noted: 2018-01-26

## 2018-01-26 PROBLEM — R80.9 PROTEINURIA: Status: RESOLVED | Noted: 2018-01-18 | Resolved: 2018-01-26

## 2018-01-26 NOTE — TELEPHONE ENCOUNTER
Please call patient and confirm follow-up appointment with urologist (discussed on 1/25).      Please then fax visit records and lab results with referral (to name of urologist) to fax (if [facility], [place] urology fax is [phone])    Thanks  Ely Henry MD

## 2018-02-06 NOTE — TELEPHONE ENCOUNTER
Attempted calling both numbers on file. 1 stated my call couldn't be completed. 2nd had another persons name on VM.   Sent letter